# Patient Record
Sex: MALE | Race: WHITE | NOT HISPANIC OR LATINO | Employment: FULL TIME | URBAN - METROPOLITAN AREA
[De-identification: names, ages, dates, MRNs, and addresses within clinical notes are randomized per-mention and may not be internally consistent; named-entity substitution may affect disease eponyms.]

---

## 2018-08-25 ENCOUNTER — HOSPITAL ENCOUNTER (EMERGENCY)
Facility: OTHER | Age: 28
Discharge: HOME | End: 2018-08-25
Attending: EMERGENCY MEDICINE
Payer: MEDICAID

## 2018-08-25 VITALS
HEART RATE: 75 BPM | WEIGHT: 240 LBS | OXYGEN SATURATION: 97 % | TEMPERATURE: 98 F | RESPIRATION RATE: 16 BRPM | HEIGHT: 71 IN | SYSTOLIC BLOOD PRESSURE: 146 MMHG | DIASTOLIC BLOOD PRESSURE: 84 MMHG | BODY MASS INDEX: 33.6 KG/M2

## 2018-08-25 DIAGNOSIS — M25.562 ACUTE PAIN OF LEFT KNEE: Primary | ICD-10-CM

## 2018-08-25 DIAGNOSIS — S89.92XA INJURY OF LEFT KNEE, INITIAL ENCOUNTER: ICD-10-CM

## 2018-08-25 DIAGNOSIS — M25.569 KNEE PAIN: ICD-10-CM

## 2018-08-25 PROCEDURE — 96372 THER/PROPH/DIAG INJ SC/IM: CPT | Mod: 59

## 2018-08-25 PROCEDURE — 99283 EMERGENCY DEPT VISIT LOW MDM: CPT | Mod: 25

## 2018-08-25 PROCEDURE — 63600175 PHARM REV CODE 636 W HCPCS: Performed by: PHYSICIAN ASSISTANT

## 2018-08-25 PROCEDURE — 29505 APPLICATION LONG LEG SPLINT: CPT | Mod: LT

## 2018-08-25 RX ORDER — IBUPROFEN 600 MG/1
600 TABLET ORAL EVERY 6 HOURS PRN
Qty: 20 TABLET | Refills: 0 | Status: SHIPPED | OUTPATIENT
Start: 2018-08-25

## 2018-08-25 RX ORDER — KETOROLAC TROMETHAMINE 30 MG/ML
30 INJECTION, SOLUTION INTRAMUSCULAR; INTRAVENOUS
Status: COMPLETED | OUTPATIENT
Start: 2018-08-25 | End: 2018-08-25

## 2018-08-25 RX ADMIN — KETOROLAC TROMETHAMINE 30 MG: 30 INJECTION, SOLUTION INTRAMUSCULAR at 10:08

## 2018-08-25 NOTE — ED NOTES
Pt presents with c/o Left knee pain after jumping with exercise today. No deformity noted.  Pt reports he was seen 7 years ago for left knee pain and was told to follow up with orthopedics, pt never followed up. Pt reports he heard a pop today right before the pain started, pt unable to tolerate pressure or extend. Pt is AAOx4. RR are even and unlabored. Skin is warm and dry.

## 2018-08-25 NOTE — ED PROVIDER NOTES
"Encounter Date: 8/25/2018       History     Chief Complaint   Patient presents with    Knee Pain     + left sided knee pain " I heard something pop and a nosie happened about 25 minutes ago". Denies numbness/tingling to extremity      28-year-old male with no significant past medical history presents emergency department with complaints of left knee pain.  He states that he was working out jumping up and down on a box when he felt something pop.  He states that he felt like he could initially instructed out however the pain worsened as he continued.  He states now he is not able to bear weight.  He reports previous injury to this knee but admits that he has never followed up for it.  Denies any weakness, numbness or tingling.  He states the pain is worse when he straightens his leg or turns from side to side.  He reports pain at a 4/10.  No current treatment.      The history is provided by the patient.     Review of patient's allergies indicates:  No Known Allergies  History reviewed. No pertinent past medical history.  History reviewed. No pertinent surgical history.  No family history on file.  Social History     Tobacco Use    Smoking status: Not on file   Substance Use Topics    Alcohol use: Not on file    Drug use: Not on file     Review of Systems   Constitutional: Negative for chills and fever.   HENT: Negative for sore throat.    Respiratory: Negative for shortness of breath.    Cardiovascular: Negative for chest pain.   Gastrointestinal: Negative for nausea.   Genitourinary: Negative for dysuria.   Musculoskeletal: Positive for arthralgias, gait problem and myalgias. Negative for back pain, joint swelling, neck pain and neck stiffness.   Skin: Negative for rash.   Neurological: Negative for weakness and numbness.   Hematological: Does not bruise/bleed easily.       Physical Exam     Initial Vitals [08/25/18 0954]   BP Pulse Resp Temp SpO2   138/87 84 16 97.6 °F (36.4 °C) 98 %      MAP       --     "     Physical Exam    Nursing note and vitals reviewed.  Constitutional: Vital signs are normal. He appears well-developed and well-nourished. He is not diaphoretic.  Non-toxic appearance. No distress.   HENT:   Head: Normocephalic and atraumatic.   Right Ear: External ear normal.   Left Ear: External ear normal.   Nose: Nose normal.   Eyes: Conjunctivae, EOM and lids are normal. Pupils are equal, round, and reactive to light. No scleral icterus.   Neck: Normal range of motion and phonation normal. Neck supple.   Cardiovascular: Normal rate, regular rhythm, normal heart sounds, intact distal pulses and normal pulses. Exam reveals no gallop, no friction rub and no decreased pulses.    No murmur heard.  Pulses:       Dorsalis pedis pulses are 2+ on the right side, and 2+ on the left side.   Abdominal: Normal appearance.   Musculoskeletal: Normal range of motion.        Left knee: He exhibits LCL laxity. He exhibits normal range of motion, no swelling, no effusion, no ecchymosis, no deformity, no laceration, no erythema, normal alignment, normal patellar mobility, no bony tenderness and normal meniscus. Tenderness found. LCL tenderness noted. No medial joint line, no MCL and no patellar tendon tenderness noted.        Legs:  No obvious deformities, moving all extremities, able to ambulate however not able to fully bear weight on left lower extremity   Neurological: He is alert and oriented to person, place, and time. He has normal strength. He displays no atrophy. No sensory deficit. He exhibits normal muscle tone.   Skin: Skin is warm, dry and intact. Capillary refill takes less than 2 seconds. No abrasion, no bruising, no ecchymosis, no laceration, no lesion and no rash noted. No erythema.   Psychiatric: He has a normal mood and affect. His speech is normal and behavior is normal. Judgment normal. Cognition and memory are normal.         ED Course   Procedures  Labs Reviewed - No data to display       Imaging Results           X-Ray Knee 3 View Left (Final result)  Result time 08/25/18 10:21:00    Final result by Alex Robles MD (08/25/18 10:21:00)                 Impression:      As above      Electronically signed by: Alex Robles MD  Date:    08/25/2018  Time:    10:21             Narrative:    EXAMINATION:  XR KNEE 3 VIEW LEFT    CLINICAL HISTORY:  Pain in unspecified knee    TECHNIQUE:  AP, lateral, and Merchant views of the left knee were performed.    COMPARISON:  None    FINDINGS:  Joint spaces are maintained.  No marginal spurring.  No acute fracture or dislocation.  There is a left knee joint effusion.  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                                 Medical Decision Making:   History:   Old Medical Records: I decided to obtain old medical records.  Initial Assessment:   28-year-old male with complaints consistent with left knee pain status post injury concerning for LCL tear.  Vital signs stable, afebrile, neurovascularly intact.  He is alert and healthy and nontoxic appearing.  He is not distressed.  He does have difficulty walking secondary to pain.  Exam documented above.  Concerns for LCL injury.  Clinical Tests:   Radiological Study: Reviewed  ED Management:  X-ray obtained from triage and independently interpreted by myself with no evidence of fracture dislocation.  According to Radiology there is a small joint effusion.  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  He was administered Toradol and placed in knee immobilizer.  Given crutches for comfort.  Will discharge home with ibuprofen and care instructions.  He is urged follow up with orthopedist in the next 48 hr or return for any worsening signs or symptoms. He states that he will follow up once he returns home.  The patient was discussed with the attending physician who agrees with treatment plan.  This is the extent of  patient's complaints today  Other:   I have discussed this case with another health care provider.       <> Summary of the Discussion: Shaka  This note was created using [employer] dictation.  There may be typographical errors secondary to dictation.                        Clinical Impression:     1. Acute pain of left knee    2. Knee pain    3. Injury of left knee, initial encounter            Disposition:   Disposition: Discharged  Condition: Stable                        Lorene Coker PA-C  08/25/18 1100